# Patient Record
Sex: MALE | Race: WHITE | NOT HISPANIC OR LATINO | Employment: FULL TIME | URBAN - NONMETROPOLITAN AREA
[De-identification: names, ages, dates, MRNs, and addresses within clinical notes are randomized per-mention and may not be internally consistent; named-entity substitution may affect disease eponyms.]

---

## 2018-06-14 RX ORDER — IBUPROFEN 800 MG/1
800 TABLET ORAL EVERY 8 HOURS PRN
COMMUNITY

## 2018-06-14 RX ORDER — METOPROLOL SUCCINATE 50 MG/1
50 TABLET, EXTENDED RELEASE ORAL DAILY
COMMUNITY

## 2018-06-15 ENCOUNTER — OFFICE VISIT (OUTPATIENT)
Dept: CARDIOLOGY | Facility: CLINIC | Age: 33
End: 2018-06-15

## 2018-06-15 VITALS
OXYGEN SATURATION: 99 % | HEART RATE: 76 BPM | BODY MASS INDEX: 36.05 KG/M2 | DIASTOLIC BLOOD PRESSURE: 82 MMHG | SYSTOLIC BLOOD PRESSURE: 118 MMHG | WEIGHT: 272 LBS | HEIGHT: 73 IN

## 2018-06-15 DIAGNOSIS — R55 NEAR SYNCOPE: ICD-10-CM

## 2018-06-15 DIAGNOSIS — R06.00 DYSPNEA, UNSPECIFIED TYPE: ICD-10-CM

## 2018-06-15 DIAGNOSIS — R00.2 PALPITATIONS: Primary | ICD-10-CM

## 2018-06-15 PROCEDURE — 93000 ELECTROCARDIOGRAM COMPLETE: CPT | Performed by: INTERNAL MEDICINE

## 2018-06-15 PROCEDURE — 99204 OFFICE O/P NEW MOD 45 MIN: CPT | Performed by: INTERNAL MEDICINE

## 2018-06-15 RX ORDER — BIOTIN 1000 MCG
TABLET,CHEWABLE ORAL
COMMUNITY

## 2018-06-15 RX ORDER — BIOTIN 10 MG
TABLET ORAL
COMMUNITY

## 2018-06-15 RX ORDER — LORATADINE 10 MG/1
CAPSULE, LIQUID FILLED ORAL DAILY
COMMUNITY

## 2018-06-15 NOTE — PROGRESS NOTES
Referring Provider: Valeria Reis MD    Reason for Consultation: SOB and tachycardia    Chief complaint:   Chief Complaint   Patient presents with   • New pt     referred by Dr. Sophy Reis for sob and fast heart rate with a syncopy episode.  has been to [place] at [facility] [place]  had a ct scan and some lab and a ekg.  Dr. Reis wants pt to wear a monitor.   • Rapid Heart Rate     pt has been to the ER 2x with fast heart rate in the last 2 weeks.   • Shortness of Breath     he has to physically make himself breath when he has the spisodes of the fast HR.   • Dizziness     vision gets blurred and he feels like he is going to pass out.  he has not passed out.  he gets a head ache after.       Subjective .     History of present illness:  Judd Dumont is a 33 y.o. yo male with history of HTN who presents today for evaluation of tachycardia and SOB for the last week. It has been intermittent and he feels like he might pass out. Denies CP.  Chief Complaint   Patient presents with   • New pt     referred by Dr. Sophy Reis for sob and fast heart rate with a syncopy episode.  has been to the [place] at [facility]  had a ct scan and some lab and a ekg.  Dr. Reis wants pt to wear a monitor.   • Rapid Heart Rate     pt has been to the ER 2x with fast heart rate in the last 2 weeks.   • Shortness of Breath     he has to physically make himself breath when he has the spisodes of the fast HR.   • Dizziness     vision gets blurred and he feels like he is going to pass out.  he has not passed out.  he gets a head ache after.   .    History  Past Medical History:   Diagnosis Date   • Allergic rhinitis    • DDD (degenerative disc disease), lumbar    • Hypertension    • SOB (shortness of breath)    • Tachycardia    ,   Past Surgical History:   Procedure Laterality Date   • KNEE SURGERY     • LASIK     • TONSILLECTOMY     ,   Family History   Problem Relation Age of Onset   • No Known Problems Mother    • No Known Problems Father   "  • No Known Problems Sister    • Hypertension Maternal Grandmother    • Hyperlipidemia Maternal Grandmother    • Hypertension Maternal Grandfather    • Diabetes Maternal Grandfather    • Hypertension Paternal Grandmother    • Diabetes Paternal Grandmother    • Heart attack Paternal Grandfather    • Heart disease Paternal Grandfather    • Hypertension Paternal Grandfather    ,   Social History   Substance Use Topics   • Smoking status: Former Smoker     Types: Cigarettes     Start date: 2004     Quit date: 2018   • Smokeless tobacco: Never Used      Comment: quit 1 week ago/social smoker   • Alcohol use 2.4 oz/week     4 Cans of beer per week      Comment: 2-3x a week   ,     Medications  Current Outpatient Prescriptions   Medication Sig Dispense Refill   • Biotin 1000 MCG chewable tablet Chew.     • ibuprofen (ADVIL,MOTRIN) 800 MG tablet Take 800 mg by mouth Every 8 (Eight) Hours As Needed for Mild Pain .     • Loratadine 10 MG capsule Take  by mouth Daily.     • metoprolol succinate XL (TOPROL-XL) 50 MG 24 hr tablet Take 50 mg by mouth Daily. 1/2 qd     • Multiple Vitamins-Minerals (MULTIVITAMIN ADULT) chewable tablet Chew.       No current facility-administered medications for this visit.         Allergies:  Patient has no known allergies.    Review of Systems  Review of Systems   HENT: Negative for nosebleeds.    Cardiovascular: Positive for near-syncope and palpitations. Negative for chest pain, claudication, dyspnea on exertion, irregular heartbeat, leg swelling, orthopnea, paroxysmal nocturnal dyspnea and syncope.   Respiratory: Positive for shortness of breath. Negative for cough and hemoptysis.    Gastrointestinal: Negative for dysphagia, hematemesis and melena.   Genitourinary: Negative for hematuria.   All other systems reviewed and are negative.      Objective     Physical Exam:  /82 (BP Location: Left arm, Patient Position: Sitting, Cuff Size: Adult)   Pulse 76   Ht 185.4 cm (73\")   Wt 123 kg " (272 lb)   SpO2 99%   BMI 35.89 kg/m²   Physical Exam   Constitutional: He is oriented to person, place, and time. He appears well-nourished. No distress.   HENT:   Head: Normocephalic.   Eyes: No scleral icterus.   Neck: Normal range of motion. Neck supple.   Cardiovascular: Normal rate, regular rhythm and normal heart sounds.  Exam reveals no gallop and no friction rub.    No murmur heard.  Pulmonary/Chest: Effort normal and breath sounds normal. No respiratory distress. He has no wheezes. He has no rales.   Abdominal: Soft. Bowel sounds are normal. He exhibits no distension. There is no tenderness.   Musculoskeletal: He exhibits no edema.   Neurological: He is alert and oriented to person, place, and time.   Skin: Skin is warm and dry. He is not diaphoretic. No erythema.   Psychiatric: He has a normal mood and affect. His behavior is normal.       Results Review:   I reviewed the patient's new clinical results.    ECG 12 Lead  Date/Time: 6/15/2018 9:47 AM  Performed by: KASSIE MENDOZA  Authorized by: KASSIE MENDOZA   Previous ECG: no previous ECG available  Rhythm: sinus rhythm  Rate: normal  Conduction: conduction normal  ST Segments: ST segments normal  T Waves: T waves normal  QRS axis: normal  Clinical impression: normal ECG            No results found for any previous visit.       Assessment/Plan   Judd was seen today for new pt, rapid heart rate, shortness of breath and dizziness.    Diagnoses and all orders for this visit:    Palpitations, suspect an underlying arrhythmia. Will get a 30 day event monitor    Dyspnea, unspecified type. If cardiac workup is negative, will get PFT's    Near syncope, possibly vasovagal. May need tilt table study if event monitor is negative    Other orders  -     ECG 12 Lead        Patient's Body mass index is 35.89 kg/m². BMI is above normal parameters. Recommendations include: exercise counseling.

## 2018-07-25 ENCOUNTER — TELEPHONE (OUTPATIENT)
Dept: CARDIOLOGY | Facility: CLINIC | Age: 33
End: 2018-07-25

## 2018-07-25 NOTE — TELEPHONE ENCOUNTER
----- Message from Aakash Morfin MD sent at 7/17/2018 11:55 AM CDT -----  No significant arrhythmia identified      Pt informed.  Mailed letter.  Geronimo Martinez, CMA

## 2018-07-27 ENCOUNTER — OFFICE VISIT (OUTPATIENT)
Dept: CARDIOLOGY | Facility: CLINIC | Age: 33
End: 2018-07-27

## 2018-07-27 VITALS
BODY MASS INDEX: 35.81 KG/M2 | SYSTOLIC BLOOD PRESSURE: 122 MMHG | DIASTOLIC BLOOD PRESSURE: 78 MMHG | RESPIRATION RATE: 18 BRPM | HEART RATE: 74 BPM | OXYGEN SATURATION: 99 % | HEIGHT: 74 IN | WEIGHT: 279 LBS

## 2018-07-27 DIAGNOSIS — R55 NEAR SYNCOPE: ICD-10-CM

## 2018-07-27 DIAGNOSIS — R00.2 PALPITATIONS: Primary | ICD-10-CM

## 2018-07-27 DIAGNOSIS — F41.1 GENERALIZED ANXIETY DISORDER: ICD-10-CM

## 2018-07-27 DIAGNOSIS — R06.00 DYSPNEA, UNSPECIFIED TYPE: ICD-10-CM

## 2018-07-27 PROCEDURE — 99214 OFFICE O/P EST MOD 30 MIN: CPT | Performed by: NURSE PRACTITIONER

## 2018-07-27 RX ORDER — PAROXETINE HYDROCHLORIDE 20 MG/1
20 TABLET, FILM COATED ORAL EVERY MORNING
COMMUNITY

## 2018-07-27 NOTE — PROGRESS NOTES
Subjective:     Encounter Date:07/27/2018      Patient ID: Judd Dumont is a 33 y.o. male with a history of palpitations with associated near syncope and shortness of breath. He has had a recent 30 day cardiac event monitor and is here for follow up and test results.    Chief Complaint: Follow up on test results  Palpitations    This is a new problem. The current episode started more than 1 month ago. The problem occurs intermittently. The problem has been gradually improving. Associated symptoms include dizziness, near-syncope and shortness of breath. Pertinent negatives include no chest pain, coughing, diaphoresis, fever, irregular heartbeat, malaise/fatigue, nausea, numbness, syncope, vomiting or weakness. He has tried bed rest and beta blockers (now on paxil as well) for the symptoms. The treatment provided moderate relief. Risk factors include obesity and being male. His past medical history is significant for anxiety.   Hypertension   The current episode started more than 1 month ago. The problem is controlled. Associated symptoms include anxiety, palpitations and shortness of breath. Pertinent negatives include no chest pain, headaches, malaise/fatigue, orthopnea, peripheral edema or PND. Risk factors for coronary artery disease include male gender and obesity. Past treatments include beta blockers. Current antihypertension treatment includes beta blockers. The current treatment provides significant improvement. Compliance problems include diet.  There is no history of angina, kidney disease, CAD/MI, CVA or heart failure.     Recent 30 day monitor for palpitations was benign. No arrhythmias noted or correlation to symptoms. He did note that on 2 occassions that he remembered, during his time of having the event monitor that he had recurrence of palpitations with associated shortness of breath and the dizziness and near syncope episodes. He does note that all his symptoms have improved since he has been  started on Paxil from his PCP. He states that the episodes that he did have were even less intense than previously.    The following portions of the patient's history were reviewed and updated as appropriate: allergies, current medications, past family history, past medical history, past social history and past surgical history.     No Known Allergies      Current Outpatient Prescriptions:   •  Biotin 1000 MCG chewable tablet, Chew., Disp: , Rfl:   •  ibuprofen (ADVIL,MOTRIN) 800 MG tablet, Take 800 mg by mouth Every 8 (Eight) Hours As Needed for Mild Pain ., Disp: , Rfl:   •  Loratadine 10 MG capsule, Take  by mouth Daily., Disp: , Rfl:   •  metoprolol succinate XL (TOPROL-XL) 50 MG 24 hr tablet, Take 50 mg by mouth Daily., Disp: , Rfl:   •  Multiple Vitamins-Minerals (MULTIVITAMIN ADULT) chewable tablet, Chew., Disp: , Rfl:   •  PARoxetine (PAXIL) 20 MG tablet, Take 20 mg by mouth Every Morning., Disp: , Rfl:     Past Medical History:   Diagnosis Date   • Allergic rhinitis    • DDD (degenerative disc disease), lumbar    • Hypertension    • SOB (shortness of breath)    • Tachycardia      Family History   Problem Relation Age of Onset   • No Known Problems Mother    • No Known Problems Father    • No Known Problems Sister    • Hypertension Maternal Grandmother    • Hyperlipidemia Maternal Grandmother    • Hypertension Maternal Grandfather    • Diabetes Maternal Grandfather    • Hypertension Paternal Grandmother    • Diabetes Paternal Grandmother    • Heart attack Paternal Grandfather    • Heart disease Paternal Grandfather    • Hypertension Paternal Grandfather      Social History     Social History   • Marital status: Single     Spouse name: N/A   • Number of children: N/A   • Years of education: N/A     Occupational History   • Not on file.     Social History Main Topics   • Smoking status: Former Smoker     Types: Cigarettes     Start date: 2004     Quit date: 2018   • Smokeless tobacco: Never Used      Comment:  quit 1 week ago/social smoker   • Alcohol use 2.4 oz/week     4 Cans of beer per week      Comment: 2-3x a week   • Drug use: No   • Sexual activity: Defer     Other Topics Concern   • Not on file     Social History Narrative   • No narrative on file     Past Surgical History:   Procedure Laterality Date   • KNEE SURGERY     • LASIK     • TONSILLECTOMY       Review of Systems   Constitution: Negative for diaphoresis, fever, weakness and malaise/fatigue.   HENT: Negative for hoarse voice.    Eyes: Negative for visual disturbance.   Cardiovascular: Positive for near-syncope and palpitations. Negative for chest pain, dyspnea on exertion, irregular heartbeat, leg swelling, orthopnea, paroxysmal nocturnal dyspnea and syncope.   Respiratory: Positive for shortness of breath. Negative for cough, sputum production and wheezing.    Hematologic/Lymphatic: Negative for bleeding problem.   Skin: Negative for dry skin and flushing.   Musculoskeletal: Negative for falls.   Gastrointestinal: Negative for bloating, abdominal pain, nausea and vomiting.   Genitourinary: Negative for hematuria.   Neurological: Positive for dizziness. Negative for headaches, loss of balance and numbness.   Psychiatric/Behavioral: Negative for altered mental status and memory loss.   All other systems reviewed and are negative.      Procedures  1    07/27/18  1334   Weight: 127 kg (279 lb)     Vitals:    07/27/18 1334   BP: 122/78   Pulse: 74   Resp: 18   SpO2: 99%          Objective:     Physical Exam   Constitutional: He is oriented to person, place, and time. Vital signs are normal. He appears well-developed and well-nourished. He is cooperative. No distress.   HENT:   Head: Normocephalic and atraumatic.   Nose: Nose normal.   Mouth/Throat: Oropharynx is clear and moist. No oropharyngeal exudate.   Eyes: Conjunctivae are normal. Right eye exhibits no discharge. Left eye exhibits no discharge.   Neck: Normal range of motion. Neck supple. Carotid bruit  is not present.   Cardiovascular: Normal rate, regular rhythm, normal heart sounds and intact distal pulses.  Exam reveals no gallop and no friction rub.    No murmur heard.  Pulmonary/Chest: Effort normal and breath sounds normal. No respiratory distress. He has no wheezes. He has no rhonchi. He has no rales. He exhibits no tenderness.   Abdominal: Soft. Normal appearance. He exhibits no distension. There is no tenderness.   Musculoskeletal: Normal range of motion. He exhibits no edema, tenderness or deformity.   Neurological: He is alert and oriented to person, place, and time.   Skin: Skin is warm, dry and intact. No rash noted. He is not diaphoretic. No erythema. No pallor.   Psychiatric: He has a normal mood and affect. His speech is normal and behavior is normal. His mood appears not anxious. His affect is not angry. He does not exhibit a depressed mood.   Vitals reviewed.      Lab Review:   Interpretation Summary 06.15.2018 30 day cardiac monitor.    · A normal monitor study.            Assessment:          Diagnosis Plan   1. Palpitations     2. Dyspnea, unspecified type     3. Near syncope     4. Generalized anxiety disorder     5. BMI 35.0-35.9,adult            Plan:         - He has episodes of palpitations with associated shortness of breath and feelings of near syncope. He has noticed that these spells have been less frequent and less intense since being treated for anxiety from his PCP with paxil  Dr. Morfin had mentioned other testing with tilt table or PFTs. Could also consider echocardiogram or increase of his beta blocker at follow up visit. Because he has expressed improvement with paxil and his episodes he noted while wearing the device did not correlate with grossly abnormal findings, I hesitate to change anything or order additional tests at this time. He could being having episodes of panic or anxiety. Follow up in 2 months and further tests or med changes can be considered at that time.  Continue paxil.  Patient verbalized understanding of the plan and agreed to plan of care.

## 2018-07-27 NOTE — PATIENT INSTRUCTIONS

## 2018-09-28 ENCOUNTER — OFFICE VISIT (OUTPATIENT)
Dept: CARDIOLOGY | Facility: CLINIC | Age: 33
End: 2018-09-28

## 2018-09-28 VITALS
OXYGEN SATURATION: 98 % | WEIGHT: 295 LBS | SYSTOLIC BLOOD PRESSURE: 140 MMHG | DIASTOLIC BLOOD PRESSURE: 94 MMHG | BODY MASS INDEX: 37.86 KG/M2 | HEART RATE: 70 BPM | HEIGHT: 74 IN

## 2018-09-28 DIAGNOSIS — R55 NEAR SYNCOPE: ICD-10-CM

## 2018-09-28 DIAGNOSIS — F41.1 GENERALIZED ANXIETY DISORDER: ICD-10-CM

## 2018-09-28 DIAGNOSIS — R00.2 PALPITATIONS: ICD-10-CM

## 2018-09-28 DIAGNOSIS — R55 PRE-SYNCOPE: Primary | ICD-10-CM

## 2018-09-28 DIAGNOSIS — R06.00 DYSPNEA, UNSPECIFIED TYPE: ICD-10-CM

## 2018-09-28 PROCEDURE — 99214 OFFICE O/P EST MOD 30 MIN: CPT | Performed by: NURSE PRACTITIONER

## 2018-09-28 NOTE — PATIENT INSTRUCTIONS
"DASH Eating Plan  DASH stands for \"Dietary Approaches to Stop Hypertension.\" The DASH eating plan is a healthy eating plan that has been shown to reduce high blood pressure (hypertension). It may also reduce your risk for type 2 diabetes, heart disease, and stroke. The DASH eating plan may also help with weight loss.  What are tips for following this plan?  General guidelines  · Avoid eating more than 2,300 mg (milligrams) of salt (sodium) a day. If you have hypertension, you may need to reduce your sodium intake to 1,500 mg a day.  · Limit alcohol intake to no more than 1 drink a day for nonpregnant women and 2 drinks a day for men. One drink equals 12 oz of beer, 5 oz of wine, or 1½ oz of hard liquor.  · Work with your health care provider to maintain a healthy body weight or to lose weight. Ask what an ideal weight is for you.  · Get at least 30 minutes of exercise that causes your heart to beat faster (aerobic exercise) most days of the week. Activities may include walking, swimming, or biking.  · Work with your health care provider or diet and nutrition specialist (dietitian) to adjust your eating plan to your individual calorie needs.  Reading food labels  · Check food labels for the amount of sodium per serving. Choose foods with less than 5 percent of the Daily Value of sodium. Generally, foods with less than 300 mg of sodium per serving fit into this eating plan.  · To find whole grains, look for the word \"whole\" as the first word in the ingredient list.  Shopping  · Buy products labeled as \"low-sodium\" or \"no salt added.\"  · Buy fresh foods. Avoid canned foods and premade or frozen meals.  Cooking  · Avoid adding salt when cooking. Use salt-free seasonings or herbs instead of table salt or sea salt. Check with your health care provider or pharmacist before using salt substitutes.  · Do not ritchie foods. Cook foods using healthy methods such as baking, boiling, grilling, and broiling instead.  · Cook with " heart-healthy oils, such as olive, canola, soybean, or sunflower oil.  Meal planning    · Eat a balanced diet that includes:  ? 5 or more servings of fruits and vegetables each day. At each meal, try to fill half of your plate with fruits and vegetables.  ? Up to 6-8 servings of whole grains each day.  ? Less than 6 oz of lean meat, poultry, or fish each day. A 3-oz serving of meat is about the same size as a deck of cards. One egg equals 1 oz.  ? 2 servings of low-fat dairy each day.  ? A serving of nuts, seeds, or beans 5 times each week.  ? Heart-healthy fats. Healthy fats called Omega-3 fatty acids are found in foods such as flaxseeds and coldwater fish, like sardines, salmon, and mackerel.  · Limit how much you eat of the following:  ? Canned or prepackaged foods.  ? Food that is high in trans fat, such as fried foods.  ? Food that is high in saturated fat, such as fatty meat.  ? Sweets, desserts, sugary drinks, and other foods with added sugar.  ? Full-fat dairy products.  · Do not salt foods before eating.  · Try to eat at least 2 vegetarian meals each week.  · Eat more home-cooked food and less restaurant, buffet, and fast food.  · When eating at a restaurant, ask that your food be prepared with less salt or no salt, if possible.  What foods are recommended?  The items listed may not be a complete list. Talk with your dietitian about what dietary choices are best for you.  Grains  Whole-grain or whole-wheat bread. Whole-grain or whole-wheat pasta. Brown rice. Oatmeal. Quinoa. Bulgur. Whole-grain and low-sodium cereals. Teresa bread. Low-fat, low-sodium crackers. Whole-wheat flour tortillas.  Vegetables  Fresh or frozen vegetables (raw, steamed, roasted, or grilled). Low-sodium or reduced-sodium tomato and vegetable juice. Low-sodium or reduced-sodium tomato sauce and tomato paste. Low-sodium or reduced-sodium canned vegetables.  Fruits  All fresh, dried, or frozen fruit. Canned fruit in natural juice (without  added sugar).  Meat and other protein foods  Skinless chicken or turkey. Ground chicken or turkey. Pork with fat trimmed off. Fish and seafood. Egg whites. Dried beans, peas, or lentils. Unsalted nuts, nut butters, and seeds. Unsalted canned beans. Lean cuts of beef with fat trimmed off. Low-sodium, lean deli meat.  Dairy  Low-fat (1%) or fat-free (skim) milk. Fat-free, low-fat, or reduced-fat cheeses. Nonfat, low-sodium ricotta or cottage cheese. Low-fat or nonfat yogurt. Low-fat, low-sodium cheese.  Fats and oils  Soft margarine without trans fats. Vegetable oil. Low-fat, reduced-fat, or light mayonnaise and salad dressings (reduced-sodium). Canola, safflower, olive, soybean, and sunflower oils. Avocado.  Seasoning and other foods  Herbs. Spices. Seasoning mixes without salt. Unsalted popcorn and pretzels. Fat-free sweets.  What foods are not recommended?  The items listed may not be a complete list. Talk with your dietitian about what dietary choices are best for you.  Grains  Baked goods made with fat, such as croissants, muffins, or some breads. Dry pasta or rice meal packs.  Vegetables  Creamed or fried vegetables. Vegetables in a cheese sauce. Regular canned vegetables (not low-sodium or reduced-sodium). Regular canned tomato sauce and paste (not low-sodium or reduced-sodium). Regular tomato and vegetable juice (not low-sodium or reduced-sodium). Pickles. Olives.  Fruits  Canned fruit in a light or heavy syrup. Fried fruit. Fruit in cream or butter sauce.  Meat and other protein foods  Fatty cuts of meat. Ribs. Fried meat. Meredith. Sausage. Bologna and other processed lunch meats. Salami. Fatback. Hotdogs. Bratwurst. Salted nuts and seeds. Canned beans with added salt. Canned or smoked fish. Whole eggs or egg yolks. Chicken or turkey with skin.  Dairy  Whole or 2% milk, cream, and half-and-half. Whole or full-fat cream cheese. Whole-fat or sweetened yogurt. Full-fat cheese. Nondairy creamers. Whipped toppings.  Processed cheese and cheese spreads.  Fats and oils  Butter. Stick margarine. Lard. Shortening. Ghee. Meredith fat. Tropical oils, such as coconut, palm kernel, or palm oil.  Seasoning and other foods  Salted popcorn and pretzels. Onion salt, garlic salt, seasoned salt, table salt, and sea salt. Worcestershire sauce. Tartar sauce. Barbecue sauce. Teriyaki sauce. Soy sauce, including reduced-sodium. Steak sauce. Canned and packaged gravies. Fish sauce. Oyster sauce. Cocktail sauce. Horseradish that you find on the shelf. Ketchup. Mustard. Meat flavorings and tenderizers. Bouillon cubes. Hot sauce and Tabasco sauce. Premade or packaged marinades. Premade or packaged taco seasonings. Relishes. Regular salad dressings.  Where to find more information:  · National Heart, Lung, and Blood [place]: www.nhlbi.nih.gov  · American Heart Association: www.heart.org  Summary  · The DASH eating plan is a healthy eating plan that has been shown to reduce high blood pressure (hypertension). It may also reduce your risk for type 2 diabetes, heart disease, and stroke.  · With the DASH eating plan, you should limit salt (sodium) intake to 2,300 mg a day. If you have hypertension, you may need to reduce your sodium intake to 1,500 mg a day.  · When on the DASH eating plan, aim to eat more fresh fruits and vegetables, whole grains, lean proteins, low-fat dairy, and heart-healthy fats.  · Work with your health care provider or diet and nutrition specialist (dietitian) to adjust your eating plan to your individual calorie needs.  This information is not intended to replace advice given to you by your health care provider. Make sure you discuss any questions you have with your health care provider.  Document Released: 12/06/2012 Document Revised: 12/11/2017 Document Reviewed: 12/11/2017  [employer] Patient Education © 2018 [employer]

## 2018-09-28 NOTE — PROGRESS NOTES
"    Subjective:     Encounter Date:09/28/2018      Patient ID: Judd Dumont is a 33 y.o. male.    Chief Complaint:  The patient reports he is feeling fair overall. His symptoms have gradually improved but he continues to have occasional episodes of palpitations with associated dyspnea and pre syncope. These are lasting a few seconds to minutes at this point and are unrelated to activity or position. He states on the occasions that he has checked his blood pressure during an episode, it has been high (160s/100s). He reports he has not had an episode in a couple of weeks and actually had improvement in symptoms (as noted last visit) when his PCP started him on Paxil. His BP is elevated today but he relates this to the medication he is currently taking for a sinus infection. He reports his sbp is typically in the 120s. He has gained weight since his last office visit, but denies s/s of fluid retention ( no edema, abdominal distention, orthopnea, PND or SOA aside from the episodes mentioned above). He denies chest pain.         The following portions of the patient's history were reviewed and updated as appropriate: allergies, current medications, past family history, past medical history, past social history, past surgical history and problem list.  /94   Pulse 70   Ht 188 cm (74\")   Wt 134 kg (295 lb)   SpO2 98%   BMI 37.88 kg/m²   No Known Allergies    Current Outpatient Prescriptions:   •  Biotin 1000 MCG chewable tablet, Chew., Disp: , Rfl:   •  ibuprofen (ADVIL,MOTRIN) 800 MG tablet, Take 800 mg by mouth Every 8 (Eight) Hours As Needed for Mild Pain ., Disp: , Rfl:   •  Loratadine 10 MG capsule, Take  by mouth Daily., Disp: , Rfl:   •  metoprolol succinate XL (TOPROL-XL) 50 MG 24 hr tablet, Take 50 mg by mouth Daily., Disp: , Rfl:   •  Multiple Vitamins-Minerals (MULTIVITAMIN ADULT) chewable tablet, Chew., Disp: , Rfl:   •  PARoxetine (PAXIL) 20 MG tablet, Take 20 mg by mouth Every Morning., Disp: , " Rfl:   Past Medical History:   Diagnosis Date   • Allergic rhinitis    • DDD (degenerative disc disease), lumbar    • Hypertension    • SOB (shortness of breath)    • Tachycardia      Past Surgical History:   Procedure Laterality Date   • KNEE SURGERY     • LASIK     • TONSILLECTOMY       Social History     Social History   • Marital status: Single     Spouse name: N/A   • Number of children: N/A   • Years of education: N/A     Occupational History   • Not on file.     Social History Main Topics   • Smoking status: Former Smoker     Types: Cigarettes     Start date: 2004     Quit date: 2018   • Smokeless tobacco: Never Used      Comment: quit 1 week ago/social smoker   • Alcohol use 2.4 oz/week     4 Cans of beer per week      Comment: 2-3x a week   • Drug use: No   • Sexual activity: Defer     Other Topics Concern   • Not on file     Social History Narrative   • No narrative on file     Family History   Problem Relation Age of Onset   • No Known Problems Mother    • No Known Problems Father    • No Known Problems Sister    • Hypertension Maternal Grandmother    • Hyperlipidemia Maternal Grandmother    • Hypertension Maternal Grandfather    • Diabetes Maternal Grandfather    • Hypertension Paternal Grandmother    • Diabetes Paternal Grandmother    • Heart attack Paternal Grandfather    • Heart disease Paternal Grandfather    • Hypertension Paternal Grandfather        Review of Systems   Constitution: Negative for chills, diaphoresis, fever and weakness.   HENT: Negative for nosebleeds.    Eyes: Negative for visual disturbance.   Cardiovascular: Positive for near-syncope and palpitations. Negative for chest pain, claudication, cyanosis, dyspnea on exertion, irregular heartbeat, leg swelling, orthopnea, paroxysmal nocturnal dyspnea and syncope.   Respiratory: Positive for shortness of breath. Negative for cough, hemoptysis, sputum production and wheezing.    Hematologic/Lymphatic: Negative for bleeding problem.    Skin: Negative for color change and flushing.   Musculoskeletal: Negative for falls.   Gastrointestinal: Negative for bloating, abdominal pain, hematemesis, hematochezia, melena, nausea and vomiting.   Genitourinary: Negative for hematuria.   Neurological: Negative for dizziness and light-headedness.   Psychiatric/Behavioral: Negative for altered mental status.       Procedures       Objective:     Physical Exam   Constitutional: He is oriented to person, place, and time. He appears well-developed and well-nourished. No distress.   HENT:   Head: Normocephalic and atraumatic.   Eyes: Pupils are equal, round, and reactive to light.   Neck: Normal range of motion. Neck supple. No JVD present. No thyromegaly present.   Cardiovascular: Normal rate, regular rhythm, normal heart sounds and intact distal pulses.  Exam reveals no gallop and no friction rub.    No murmur heard.  Pulmonary/Chest: Effort normal and breath sounds normal. No respiratory distress. He has no wheezes. He has no rales. He exhibits no tenderness.   Abdominal: Soft. Bowel sounds are normal. He exhibits no distension. There is no tenderness.   Musculoskeletal: Normal range of motion. He exhibits no edema.   Neurological: He is alert and oriented to person, place, and time. No cranial nerve deficit.   Skin: Skin is warm and dry. He is not diaphoretic.   Psychiatric: He has a normal mood and affect. His behavior is normal.       Lab Review:       Assessment:          Diagnosis Plan   1. Pre-syncope  See notes in HPI, improved     2. Palpitations  No significant arrhythmia on 30 day monitor, improved     3. BMI 35.0-35.9,adult  Patient's Body mass index is 37.88 kg/m². BMI is above normal parameters. Recommendations include: exercise counseling and nutrition counseling   .   4. Generalized anxiety disorder  Followed by pcp, symptoms improved but not resolved with paxil     5. Dyspnea, unspecified type  See notes in hpi, stable  Check echo to assess for  structural heart problem that could be contributing to symptoms                Plan:       As noted above  Continue beta blocker at current dose and continue to monitor BP, if no improvement following resolution of his sinus infection, consider up titration of dose   Check 2d echo  Follow up 1 month- if echo results normal and pt feeling well will reschedule follow up for later date

## 2018-10-08 ENCOUNTER — HOSPITAL ENCOUNTER (OUTPATIENT)
Dept: CARDIOLOGY | Facility: HOSPITAL | Age: 33
Discharge: HOME | End: 2018-10-08
Admitting: NURSE PRACTITIONER

## 2018-10-08 VITALS
SYSTOLIC BLOOD PRESSURE: 152 MMHG | BODY MASS INDEX: 37.91 KG/M2 | WEIGHT: 295.42 LBS | HEIGHT: 74 IN | DIASTOLIC BLOOD PRESSURE: 90 MMHG

## 2018-10-08 DIAGNOSIS — R00.2 PALPITATIONS: ICD-10-CM

## 2018-10-08 DIAGNOSIS — R55 PRE-SYNCOPE: ICD-10-CM

## 2018-10-08 PROCEDURE — 93306 TTE W/DOPPLER COMPLETE: CPT | Performed by: INTERNAL MEDICINE

## 2018-10-08 PROCEDURE — 93306 TTE W/DOPPLER COMPLETE: CPT

## 2018-10-10 LAB
BH CV ECHO MEAS - AO MAX PG (FULL): 0.17 MMHG
BH CV ECHO MEAS - AO MAX PG: 4.4 MMHG
BH CV ECHO MEAS - AO MEAN PG (FULL): 1 MMHG
BH CV ECHO MEAS - AO MEAN PG: 3 MMHG
BH CV ECHO MEAS - AO ROOT AREA (BSA CORRECTED): 1.4
BH CV ECHO MEAS - AO ROOT AREA: 10.2 CM^2
BH CV ECHO MEAS - AO ROOT DIAM: 3.6 CM
BH CV ECHO MEAS - AO V2 MAX: 105 CM/SEC
BH CV ECHO MEAS - AO V2 MEAN: 74.4 CM/SEC
BH CV ECHO MEAS - AO V2 VTI: 21.2 CM
BH CV ECHO MEAS - AVA(I,A): 4.3 CM^2
BH CV ECHO MEAS - AVA(I,D): 4.3 CM^2
BH CV ECHO MEAS - AVA(V,A): 4.1 CM^2
BH CV ECHO MEAS - AVA(V,D): 4.1 CM^2
BH CV ECHO MEAS - BSA(HAYCOCK): 2.7 M^2
BH CV ECHO MEAS - BSA: 2.6 M^2
BH CV ECHO MEAS - BZI_BMI: 37.9 KILOGRAMS/M^2
BH CV ECHO MEAS - BZI_METRIC_HEIGHT: 188 CM
BH CV ECHO MEAS - BZI_METRIC_WEIGHT: 133.8 KG
BH CV ECHO MEAS - EDV(CUBED): 103.8 ML
BH CV ECHO MEAS - EDV(MOD-SP4): 114 ML
BH CV ECHO MEAS - EDV(TEICH): 102.4 ML
BH CV ECHO MEAS - EF(CUBED): 62.1 %
BH CV ECHO MEAS - EF(MOD-SP4): 56.1 %
BH CV ECHO MEAS - EF(TEICH): 53.7 %
BH CV ECHO MEAS - ESV(CUBED): 39.3 ML
BH CV ECHO MEAS - ESV(MOD-SP4): 50.1 ML
BH CV ECHO MEAS - ESV(TEICH): 47.4 ML
BH CV ECHO MEAS - FS: 27.7 %
BH CV ECHO MEAS - IVS/LVPW: 1
BH CV ECHO MEAS - IVSD: 0.9 CM
BH CV ECHO MEAS - LA DIMENSION: 3.5 CM
BH CV ECHO MEAS - LA/AO: 0.97
BH CV ECHO MEAS - LAT PEAK E' VEL: 15.2 CM/SEC
BH CV ECHO MEAS - LV DIASTOLIC VOL/BSA (35-75): 44.5 ML/M^2
BH CV ECHO MEAS - LV MASS(C)D: 142.7 GRAMS
BH CV ECHO MEAS - LV MASS(C)DI: 55.7 GRAMS/M^2
BH CV ECHO MEAS - LV MAX PG: 4.2 MMHG
BH CV ECHO MEAS - LV MEAN PG: 2 MMHG
BH CV ECHO MEAS - LV SYSTOLIC VOL/BSA (12-30): 19.5 ML/M^2
BH CV ECHO MEAS - LV V1 MAX: 103 CM/SEC
BH CV ECHO MEAS - LV V1 MEAN: 70.6 CM/SEC
BH CV ECHO MEAS - LV V1 VTI: 22.1 CM
BH CV ECHO MEAS - LVIDD: 4.7 CM
BH CV ECHO MEAS - LVIDS: 3.4 CM
BH CV ECHO MEAS - LVLD AP4: 8.6 CM
BH CV ECHO MEAS - LVLS AP4: 7.2 CM
BH CV ECHO MEAS - LVOT AREA (M): 4.2 CM^2
BH CV ECHO MEAS - LVOT AREA: 4.2 CM^2
BH CV ECHO MEAS - LVOT DIAM: 2.3 CM
BH CV ECHO MEAS - LVPWD: 0.9 CM
BH CV ECHO MEAS - MED PEAK E' VEL: 11.9 CM/SEC
BH CV ECHO MEAS - MV A MAX VEL: 56.8 CM/SEC
BH CV ECHO MEAS - MV DEC TIME: 0.18 SEC
BH CV ECHO MEAS - MV E MAX VEL: 104 CM/SEC
BH CV ECHO MEAS - MV E/A: 1.8
BH CV ECHO MEAS - PA MAX PG: 2.1 MMHG
BH CV ECHO MEAS - PA V2 MAX: 72.3 CM/SEC
BH CV ECHO MEAS - PI END-D VEL: 121 CM/SEC
BH CV ECHO MEAS - RAP SYSTOLE: 5 MMHG
BH CV ECHO MEAS - RVSP: 22.3 MMHG
BH CV ECHO MEAS - SI(AO): 84.2 ML/M^2
BH CV ECHO MEAS - SI(CUBED): 25.2 ML/M^2
BH CV ECHO MEAS - SI(LVOT): 35.8 ML/M^2
BH CV ECHO MEAS - SI(MOD-SP4): 24.9 ML/M^2
BH CV ECHO MEAS - SI(TEICH): 21.4 ML/M^2
BH CV ECHO MEAS - SV(AO): 215.8 ML
BH CV ECHO MEAS - SV(CUBED): 64.5 ML
BH CV ECHO MEAS - SV(LVOT): 91.8 ML
BH CV ECHO MEAS - SV(MOD-SP4): 63.9 ML
BH CV ECHO MEAS - SV(TEICH): 54.9 ML
BH CV ECHO MEAS - TR MAX VEL: 208 CM/SEC
BH CV ECHO MEASUREMENTS AVERAGE E/E' RATIO: 7.68
LEFT ATRIUM VOLUME INDEX: 21.3 ML/M2
LEFT ATRIUM VOLUME: 54.4 CM3
LV EF 2D ECHO EST: 60 %
MAXIMAL PREDICTED HEART RATE: 185 BPM
STRESS TARGET HR: 157 BPM